# Patient Record
Sex: FEMALE | Race: OTHER
[De-identification: names, ages, dates, MRNs, and addresses within clinical notes are randomized per-mention and may not be internally consistent; named-entity substitution may affect disease eponyms.]

---

## 2022-07-07 ENCOUNTER — HOSPITAL ENCOUNTER (EMERGENCY)
Dept: HOSPITAL 54 - ER | Age: 22
Discharge: HOME | End: 2022-07-07
Payer: COMMERCIAL

## 2022-07-07 VITALS — WEIGHT: 100 LBS | BODY MASS INDEX: 17.72 KG/M2 | HEIGHT: 63 IN

## 2022-07-07 VITALS — SYSTOLIC BLOOD PRESSURE: 110 MMHG | DIASTOLIC BLOOD PRESSURE: 60 MMHG

## 2022-07-07 DIAGNOSIS — R41.82: ICD-10-CM

## 2022-07-07 DIAGNOSIS — F19.10: ICD-10-CM

## 2022-07-07 DIAGNOSIS — F14.129: Primary | ICD-10-CM

## 2022-07-07 DIAGNOSIS — Z88.0: ICD-10-CM

## 2022-07-07 DIAGNOSIS — Z60.2: ICD-10-CM

## 2022-07-07 PROCEDURE — 36415 COLL VENOUS BLD VENIPUNCTURE: CPT

## 2022-07-07 PROCEDURE — G0480 DRUG TEST DEF 1-7 CLASSES: HCPCS

## 2022-07-07 PROCEDURE — 99283 EMERGENCY DEPT VISIT LOW MDM: CPT

## 2022-07-07 PROCEDURE — 80307 DRUG TEST PRSMV CHEM ANLYZR: CPT

## 2022-07-07 PROCEDURE — 80320 DRUG SCREEN QUANTALCOHOLS: CPT

## 2022-07-07 SDOH — SOCIAL STABILITY - SOCIAL INSECURITY: PROBLEMS RELATED TO LIVING ALONE: Z60.2

## 2022-07-07 NOTE — NUR
Pt states "feeling better- want to go home" MD aware w/orders for discharge 
Patient discharged to home in stable condition. Written and verbal after care 
instructions given. Patient verbalizes understanding of instruction.